# Patient Record
Sex: FEMALE | Race: ASIAN | NOT HISPANIC OR LATINO | Employment: STUDENT | URBAN - METROPOLITAN AREA
[De-identification: names, ages, dates, MRNs, and addresses within clinical notes are randomized per-mention and may not be internally consistent; named-entity substitution may affect disease eponyms.]

---

## 2017-01-09 ENCOUNTER — TELEPHONE (OUTPATIENT)
Dept: FAMILY MEDICINE | Age: 22
End: 2017-01-09

## 2017-01-12 ENCOUNTER — TELEPHONE (OUTPATIENT)
Dept: FAMILY MEDICINE | Age: 22
End: 2017-01-12

## 2017-01-12 ENCOUNTER — IMAGING SERVICES (OUTPATIENT)
Dept: ULTRASOUND IMAGING | Age: 22
End: 2017-01-12
Attending: FAMILY MEDICINE

## 2017-01-12 ENCOUNTER — LAB SERVICES (OUTPATIENT)
Dept: LAB | Age: 22
End: 2017-01-12

## 2017-01-12 ENCOUNTER — OFFICE VISIT (OUTPATIENT)
Dept: FAMILY MEDICINE | Age: 22
End: 2017-01-12

## 2017-01-12 VITALS
HEIGHT: 63 IN | SYSTOLIC BLOOD PRESSURE: 112 MMHG | BODY MASS INDEX: 22.5 KG/M2 | WEIGHT: 127 LBS | DIASTOLIC BLOOD PRESSURE: 72 MMHG | HEART RATE: 72 BPM | OXYGEN SATURATION: 99 %

## 2017-01-12 DIAGNOSIS — G44.229 CHRONIC TENSION-TYPE HEADACHE, NOT INTRACTABLE: ICD-10-CM

## 2017-01-12 DIAGNOSIS — G89.29 CHRONIC NONINTRACTABLE HEADACHE, UNSPECIFIED HEADACHE TYPE: ICD-10-CM

## 2017-01-12 DIAGNOSIS — R10.30 LOWER ABDOMINAL PAIN: ICD-10-CM

## 2017-01-12 DIAGNOSIS — R51.9 CHRONIC NONINTRACTABLE HEADACHE, UNSPECIFIED HEADACHE TYPE: ICD-10-CM

## 2017-01-12 DIAGNOSIS — R10.30 LOWER ABDOMINAL PAIN: Primary | ICD-10-CM

## 2017-01-12 DIAGNOSIS — N92.6 IRREGULAR MENSES: ICD-10-CM

## 2017-01-12 LAB
ALBUMIN SERPL-MCNC: 3.4 G/DL (ref 3.6–5.1)
ALBUMIN/GLOB SERPL: 0.9 {RATIO} (ref 1–2.4)
ALP SERPL-CCNC: 30 UNITS/L (ref 45–117)
ALT SERPL-CCNC: 21 UNITS/L
ANION GAP SERPL CALC-SCNC: 11 MMOL/L (ref 10–20)
AST SERPL-CCNC: 30 UNITS/L
BASO+EOS+MONOS # BLD AUTO: 0.2 K/MCL (ref 0.1–1.1)
BASO+EOS+MONOS NFR BLD AUTO: 5 %
BILIRUB SERPL-MCNC: 1 MG/DL (ref 0.2–1)
BUN SERPL-MCNC: 10 MG/DL (ref 10–20)
BUN/CREAT SERPL: 10 (ref 7–25)
CALCIUM SERPL-MCNC: 8.8 MG/DL (ref 8.4–10.2)
CHLORIDE SERPL-SCNC: 105 MMOL/L (ref 98–107)
CO2 SERPL-SCNC: 23 MMOL/L (ref 21–32)
CREAT SERPL-MCNC: 1 MG/DL (ref 0.51–0.95)
DIFFERENTIAL METHOD BLD: NORMAL
ERYTHROCYTE [DISTWIDTH] IN BLOOD: 12.8 % (ref 11–15)
ERYTHROCYTE [SEDIMENTATION RATE] IN BLOOD: 13 MM/HR (ref 0–20)
FASTING STATUS PATIENT QL REPORTED: 1 HRS
GLOBULIN SER-MCNC: 3.6 G/DL (ref 2–4)
GLUCOSE SERPL-MCNC: 82 MG/DL (ref 65–99)
HCT VFR BLD CALC: 37.3 % (ref 36–46.5)
HGB BLD-MCNC: 12.7 G/DL (ref 12–15.5)
LYMPHOCYTES # BLD AUTO: 1.8 K/MCL (ref 1.2–5.2)
LYMPHOCYTES NFR BLD AUTO: 40 %
MCH RBC QN AUTO: 29.5 PG (ref 26–34)
MCHC RBC AUTO-ENTMCNC: 34 G/DL (ref 32–36.5)
MCV RBC AUTO: 86.5 FL (ref 78–100)
NEUTROPHILS # BLD AUTO: 2.5 K/MCL (ref 1.8–8)
NEUTROPHILS NFR BLD AUTO: 55 %
PLATELET # BLD: 235 K/MCL (ref 140–450)
POTASSIUM SERPL-SCNC: 4.1 MMOL/L (ref 3.4–5.1)
PROT SERPL-MCNC: 7 G/DL (ref 6.4–8.2)
RBC # BLD: 4.31 MIL/MCL (ref 4–5.2)
SODIUM SERPL-SCNC: 135 MMOL/L (ref 135–145)
TSH SERPL-ACNC: 3.36 MCUNITS/ML (ref 0.35–5)
WBC # BLD: 4.5 K/MCL (ref 4.2–11)

## 2017-01-12 PROCEDURE — 80050 GENERAL HEALTH PANEL: CPT | Performed by: INTERNAL MEDICINE

## 2017-01-12 PROCEDURE — 76830 TRANSVAGINAL US NON-OB: CPT | Performed by: RADIOLOGY

## 2017-01-12 PROCEDURE — 36415 COLL VENOUS BLD VENIPUNCTURE: CPT | Performed by: INTERNAL MEDICINE

## 2017-01-12 PROCEDURE — 99214 OFFICE O/P EST MOD 30 MIN: CPT | Performed by: FAMILY MEDICINE

## 2017-01-12 PROCEDURE — 85652 RBC SED RATE AUTOMATED: CPT | Performed by: INTERNAL MEDICINE

## 2017-01-12 PROCEDURE — 76856 US EXAM PELVIC COMPLETE: CPT | Performed by: RADIOLOGY

## 2017-01-12 RX ORDER — MELOXICAM 15 MG/1
15 TABLET ORAL DAILY PRN
Qty: 90 TABLET | Refills: 4 | Status: SHIPPED | OUTPATIENT
Start: 2017-01-12 | End: 2019-11-25

## 2017-01-12 RX ORDER — TIZANIDINE 2 MG/1
2 TABLET ORAL EVERY 6 HOURS PRN
Qty: 90 TABLET | Refills: 4 | Status: SHIPPED | OUTPATIENT
Start: 2017-01-12 | End: 2019-11-25

## 2017-01-13 RX ORDER — NORETHINDRONE ACETATE AND ETHINYL ESTRADIOL 1.5-30(21)
1 KIT ORAL DAILY
Qty: 84 TABLET | Refills: 4 | Status: SHIPPED | OUTPATIENT
Start: 2017-01-13 | End: 2017-04-28 | Stop reason: SDUPTHER

## 2017-01-13 ASSESSMENT — ENCOUNTER SYMPTOMS
ABDOMINAL DISTENTION: 1
TROUBLE SWALLOWING: 0
DIZZINESS: 0
FEVER: 0
CHILLS: 0
SHORTNESS OF BREATH: 0
WEAKNESS: 0
HEADACHES: 1
NERVOUS/ANXIOUS: 1
FATIGUE: 1
DIARRHEA: 1
LIGHT-HEADEDNESS: 0
WHEEZING: 0
COUGH: 0
ABDOMINAL PAIN: 1

## 2017-01-16 DIAGNOSIS — L70.9 ADULT ACNE: ICD-10-CM

## 2017-01-16 RX ORDER — CLINDAMYCIN PHOSPHATE 10 MG/G
GEL TOPICAL
Qty: 60 G | Refills: 11 | Status: SHIPPED | OUTPATIENT
Start: 2017-01-16 | End: 2019-11-25

## 2017-04-27 ENCOUNTER — TELEPHONE (OUTPATIENT)
Dept: FAMILY MEDICINE | Age: 22
End: 2017-04-27

## 2017-04-27 DIAGNOSIS — N92.6 IRREGULAR MENSES: ICD-10-CM

## 2017-04-28 RX ORDER — NORETHINDRONE ACETATE AND ETHINYL ESTRADIOL 1.5-30(21)
1 KIT ORAL DAILY
Qty: 84 TABLET | Refills: 4 | Status: SHIPPED | OUTPATIENT
Start: 2017-04-28 | End: 2017-09-07 | Stop reason: SDUPTHER

## 2017-09-07 DIAGNOSIS — N92.6 IRREGULAR MENSES: ICD-10-CM

## 2017-09-07 RX ORDER — NORETHINDRONE ACETATE AND ETHINYL ESTRADIOL AND FERROUS FUMARATE 1.5-30(21)
KIT ORAL
Qty: 84 TABLET | Refills: 1 | Status: SHIPPED | OUTPATIENT
Start: 2017-09-07 | End: 2019-11-25 | Stop reason: ALTCHOICE

## 2017-11-25 ENCOUNTER — IMAGING SERVICES (OUTPATIENT)
Dept: GENERAL RADIOLOGY | Age: 22
End: 2017-11-25
Attending: FAMILY MEDICINE

## 2017-11-25 ENCOUNTER — WALK IN (OUTPATIENT)
Dept: URGENT CARE | Age: 22
End: 2017-11-25

## 2017-11-25 VITALS
RESPIRATION RATE: 16 BRPM | HEART RATE: 98 BPM | WEIGHT: 115 LBS | DIASTOLIC BLOOD PRESSURE: 64 MMHG | HEIGHT: 60 IN | TEMPERATURE: 98.5 F | SYSTOLIC BLOOD PRESSURE: 106 MMHG | BODY MASS INDEX: 22.58 KG/M2

## 2017-11-25 DIAGNOSIS — M79.671 RIGHT FOOT PAIN: ICD-10-CM
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
## 2017-11-25 DIAGNOSIS — M79.672 LEFT FOOT PAIN: ICD-10-CM

## 2017-11-25 DIAGNOSIS — M79.671 RIGHT FOOT PAIN: Primary | ICD-10-CM

## 2017-11-25 DIAGNOSIS — S96.911A REPETITIVE STRAIN INJURY OF RIGHT FOOT, INITIAL ENCOUNTER: ICD-10-CM

## 2017-11-25 PROCEDURE — 99213 OFFICE O/P EST LOW 20 MIN: CPT | Performed by: FAMILY MEDICINE

## 2017-11-25 PROCEDURE — 73630 X-RAY EXAM OF FOOT: CPT | Performed by: RADIOLOGY

## 2019-11-25 ENCOUNTER — OFFICE VISIT (OUTPATIENT)
Dept: FAMILY MEDICINE | Age: 24
End: 2019-11-25

## 2019-11-25 VITALS
HEART RATE: 84 BPM | TEMPERATURE: 97.7 F | DIASTOLIC BLOOD PRESSURE: 70 MMHG | SYSTOLIC BLOOD PRESSURE: 110 MMHG | BODY MASS INDEX: 22.82 KG/M2 | WEIGHT: 116.84 LBS

## 2019-11-25 DIAGNOSIS — K21.9 GASTROESOPHAGEAL REFLUX DISEASE, ESOPHAGITIS PRESENCE NOT SPECIFIED: ICD-10-CM

## 2019-11-25 DIAGNOSIS — Z00.00 ENCOUNTER FOR PREVENTIVE HEALTH EXAMINATION: Primary | ICD-10-CM

## 2019-11-25 PROCEDURE — 99385 PREV VISIT NEW AGE 18-39: CPT | Performed by: FAMILY MEDICINE

## 2019-11-25 RX ORDER — NORETHINDRONE ACETATE AND ETHINYL ESTRADIOL .03; 1.5 MG/1; MG/1
1 TABLET ORAL DAILY
COMMUNITY
End: 2019-11-25 | Stop reason: SDUPTHER

## 2019-11-25 RX ORDER — OMEPRAZOLE 40 MG/1
40 CAPSULE, DELAYED RELEASE ORAL DAILY
Qty: 30 CAPSULE | Refills: 1 | Status: SHIPPED | OUTPATIENT
Start: 2019-11-25 | End: 2019-11-25 | Stop reason: SDUPTHER

## 2019-11-25 RX ORDER — OMEPRAZOLE 40 MG/1
40 CAPSULE, DELAYED RELEASE ORAL DAILY
Qty: 90 CAPSULE | Refills: 0 | Status: SHIPPED | OUTPATIENT
Start: 2019-11-25

## 2019-11-25 RX ORDER — NORETHINDRONE ACETATE AND ETHINYL ESTRADIOL 1.5; 3 MG/1; UG/1
1 TABLET ORAL DAILY
Qty: 84 TABLET | Refills: 3 | Status: SHIPPED | OUTPATIENT
Start: 2019-11-25

## 2019-11-25 ASSESSMENT — PATIENT HEALTH QUESTIONNAIRE - PHQ9
SUM OF ALL RESPONSES TO PHQ9 QUESTIONS 1 AND 2: 0
2. FEELING DOWN, DEPRESSED OR HOPELESS: NOT AT ALL
1. LITTLE INTEREST OR PLEASURE IN DOING THINGS: NOT AT ALL
SUM OF ALL RESPONSES TO PHQ9 QUESTIONS 1 AND 2: 0

## 2020-03-25 ENCOUNTER — V-VISIT (OUTPATIENT)
Dept: FAMILY MEDICINE | Age: 25
End: 2020-03-25

## 2020-03-25 ENCOUNTER — NURSE TRIAGE (OUTPATIENT)
Dept: FAMILY MEDICINE | Age: 25
End: 2020-03-25

## 2020-03-25 DIAGNOSIS — J02.9 ACUTE VIRAL PHARYNGITIS: Primary | ICD-10-CM

## 2020-03-25 PROCEDURE — 99443 TELEPHONE E&M BY PHYSICIAN EST PT NOT ORIG PREV 7 DAYS 21-30 MIN: CPT | Performed by: NURSE PRACTITIONER

## 2020-03-25 RX ORDER — ACETAMINOPHEN 325 MG/1
650 TABLET ORAL EVERY 6 HOURS PRN
Qty: 30 TABLET | Refills: 0 | Status: SHIPPED | COMMUNITY
Start: 2020-03-25 | End: 2020-04-24

## 2020-07-06 ENCOUNTER — THERAPY VISIT (OUTPATIENT)
Dept: PHYSICAL THERAPY | Facility: CLINIC | Age: 25
End: 2020-07-06
Payer: COMMERCIAL

## 2020-07-06 DIAGNOSIS — S76.312A LEFT HAMSTRING MUSCLE STRAIN: ICD-10-CM

## 2020-07-06 DIAGNOSIS — M25.571 PAIN IN JOINT INVOLVING ANKLE AND FOOT, RIGHT: ICD-10-CM

## 2020-07-06 DIAGNOSIS — M94.0 COSTOCHONDRITIS: ICD-10-CM

## 2020-07-06 PROCEDURE — 97110 THERAPEUTIC EXERCISES: CPT | Mod: GP | Performed by: PHYSICAL THERAPIST

## 2020-07-06 PROCEDURE — 97161 PT EVAL LOW COMPLEX 20 MIN: CPT | Mod: GP | Performed by: PHYSICAL THERAPIST

## 2020-07-06 NOTE — PROGRESS NOTES
[facility] Initial Evaluation  Subjective:  The history is provided by the patient. No  was used.   Therapist Generated HPI Evaluation  Problem details: Had a viral infection in March that lead to a lot of coughing.  Pain started March 30th with pain on the left greater than right. Took Naproxen for two months and this helped.  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  Has not been doing any strength training and lighter weights.  Dance irritates.  Stretching helps.  Works in IT during the day.. Sitting in her kitchen at a barstool. Icing twice a day and also using heating pad.  Ice helps more.  .         Affected Side: sternum.    This is a new condition.  Occurance: after virus.    Site of Pain: left thoracic.        Associated symptoms:  Loss of strength and loss of motion/stiffness. Exacerbated by: seatbelt and back pack.  and relieved by heat, ice and NSAID's.          Therapist Generated HPI Evaluation  Problem details: Left hamstring pain after attempting left splits with training for dance team.  That happened April 30th.  Felt a sudden pop.  Now slowly stretching.  Has not been strengthening.  Pain with sitting.  No radiating pain.  Mostly dancing for activity and does 10 min of yoga in morning and evening.  Has returned to some [employer] classes. .         Type of problem:  Left hip.      Condition occurred with:  Other reason.            Associated symptoms:  Loss of strength. Symptoms are exacerbated by sitting (stretching)  and relieved by rest.                              Objective:  System    Ankle/Foot Evaluation  ROM:  Arom ankle eval: pain right ankle with end range active pf.            PALPATION: Palpation of ankle: swelling at right extensor retinaculum.                  Cervical/Thoracic Evaluation  Cervical AROM: normal                                  Shoulder Evaluation:  ROM:  AROM:  : pain in the chest with  repeated abduction                                   Strength:  : pain with resisted adduction                           Palpation:  Palpation assessed shoulder: pain in left greater than right upper ribs/sternum.                              Hip Evaluation  Hip PROM:  : limited by decreased hamstring flexibility on the left.                            Hip Special Testing:   Special tests hip not assessed: limited forward pain due to left hamstring pain.        Hip Palpation:  Palpations normal left hip: pain at ischial tuberosity.      Functional Testing:  Functional test hip: pain with single leg deadlift.                       General     ROS    Assessment/Plan:    Patient is a 24 year old female with thoracic, left side hip and right side ankle complaints.    Patient has the following significant findings with corresponding treatment plan.                Diagnosis 1:  costochondritis  Pain -  hot/cold therapy, manual therapy, splint/taping/bracing/orthotics, self management, education and home program  Decreased ROM/flexibility - manual therapy, therapeutic exercise and home program  Decreased strength - therapeutic exercise, therapeutic activities and home program  Decreased function - therapeutic activities and home program  Diagnosis 2:  Left hamstring strain   Pain -  manual therapy, self management, education and home program  Decreased ROM/flexibility - manual therapy, therapeutic exercise and home program  Decreased strength - therapeutic exercise, therapeutic activities and home program  Decreased function - therapeutic activities and home program  Diagnosis 3:  Right ankle pain  Pain -  hot/cold therapy, self management, education and home program  Decreased ROM/flexibility - manual therapy, therapeutic exercise and home program     Therapy Evaluation Codes:   1) History comprised of:   Personal factors that impact the plan of care:      None.    Comorbidity factors that impact the plan of care are:       None.     Medications impacting care: None.  2) Examination of Body Systems comprised of:   Body structures and functions that impact the plan of care:      Ankle, Hip and Thoracic Spine.   Activity limitations that impact the plan of care are:      Bathing, Sitting, Sports and Squatting/kneeling.  3) Clinical presentation characteristics are:   Stable/Uncomplicated.  4) Decision-Making    Low complexity using standardized patient assessment instrument and/or measureable assessment of functional outcome.  Cumulative Therapy Evaluation is: Low complexity.    Previous and current functional limitations:  (See Goal Flow Sheet for this information)    Short term and Long term goals: (See Goal Flow Sheet for this information)     Communication ability:  Patient appears to be able to clearly communicate and understand verbal and written communication and follow directions correctly.  Treatment Explanation - The following has been discussed with the patient:   RX ordered/plan of care  Anticipated outcomes  Possible risks and side effects  This patient would benefit from PT intervention to resume normal activities.   Rehab potential is excellent.    Frequency:  1 X week, once daily  Duration:  for 4 weeks  Discharge Plan:  Independent in home treatment program.  Reach maximal therapeutic benefit.    Please refer to the daily flowsheet for treatment today, total treatment time and time spent performing 1:1 timed codes.

## 2020-07-15 NOTE — PROGRESS NOTES
[facility] Initial Evaluation  Subjective:    Patient Health History           General health as reported by patient is good.     Red flags:  Chest pain.  Medical allergies: none.   Surgeries include:  None.    Current medications:  None.    Current occupation is .   Primary job tasks include:  Computer work.                                    Objective:  System    Physical Exam    General     ROS    Assessment/Plan:

## 2020-08-18 PROBLEM — S76.312A LEFT HAMSTRING MUSCLE STRAIN: Status: RESOLVED | Noted: 2020-07-06 | Resolved: 2020-08-18

## 2020-08-18 PROBLEM — M25.571 PAIN IN JOINT INVOLVING ANKLE AND FOOT, RIGHT: Status: RESOLVED | Noted: 2020-07-06 | Resolved: 2020-08-18

## 2020-08-18 PROBLEM — M94.0 COSTOCHONDRITIS: Status: RESOLVED | Noted: 2020-07-06 | Resolved: 2020-08-18

## 2020-12-27 ENCOUNTER — HEALTH MAINTENANCE LETTER (OUTPATIENT)
Age: 25
End: 2020-12-27

## 2021-04-07 ENCOUNTER — IMMUNIZATION (OUTPATIENT)
Dept: NURSING | Facility: CLINIC | Age: 26
End: 2021-04-07
Payer: OTHER GOVERNMENT

## 2021-04-07 PROCEDURE — 91300 PR COVID VAC PFIZER DIL RECON 30 MCG/0.3 ML IM: CPT

## 2021-04-07 PROCEDURE — 0001A PR COVID VAC PFIZER DIL RECON 30 MCG/0.3 ML IM: CPT

## 2021-05-03 ENCOUNTER — IMMUNIZATION (OUTPATIENT)
Dept: NURSING | Facility: CLINIC | Age: 26
End: 2021-05-03
Attending: INTERNAL MEDICINE
Payer: OTHER GOVERNMENT

## 2021-05-03 PROCEDURE — 91300 PR COVID VAC PFIZER DIL RECON 30 MCG/0.3 ML IM: CPT

## 2021-05-03 PROCEDURE — 0002A PR COVID VAC PFIZER DIL RECON 30 MCG/0.3 ML IM: CPT

## 2021-10-09 ENCOUNTER — HEALTH MAINTENANCE LETTER (OUTPATIENT)
Age: 26
End: 2021-10-09

## 2022-01-29 ENCOUNTER — HEALTH MAINTENANCE LETTER (OUTPATIENT)
Age: 27
End: 2022-01-29

## 2022-09-11 ENCOUNTER — HEALTH MAINTENANCE LETTER (OUTPATIENT)
Age: 27
End: 2022-09-11

## 2023-05-06 ENCOUNTER — HEALTH MAINTENANCE LETTER (OUTPATIENT)
Age: 28
End: 2023-05-06

## 2025-04-26 ENCOUNTER — HEALTH MAINTENANCE LETTER (OUTPATIENT)
Age: 30
End: 2025-04-26

## 2025-05-23 ENCOUNTER — TRANSFERRED RECORDS (OUTPATIENT)
Dept: MULTI SPECIALTY CLINIC | Facility: CLINIC | Age: 30
End: 2025-05-23

## 2025-05-23 PROBLEM — K21.9 GASTROESOPHAGEAL REFLUX DISEASE: Status: ACTIVE | Noted: 2019-11-25

## 2025-05-23 PROBLEM — K58.1 IRRITABLE BOWEL SYNDROME WITH CONSTIPATION: Status: ACTIVE | Noted: 2025-05-23

## 2025-05-23 LAB — PAP SMEAR - HIM PATIENT REPORTED: NORMAL

## 2025-05-23 PROCEDURE — 87624 HPV HI-RISK TYP POOLED RSLT: CPT | Mod: ORL | Performed by: FAMILY MEDICINE

## 2025-05-23 PROCEDURE — 82306 VITAMIN D 25 HYDROXY: CPT | Mod: ORL | Performed by: FAMILY MEDICINE

## 2025-05-23 PROCEDURE — 86803 HEPATITIS C AB TEST: CPT | Mod: ORL | Performed by: FAMILY MEDICINE

## 2025-05-23 PROCEDURE — 80061 LIPID PANEL: CPT | Mod: ORL | Performed by: FAMILY MEDICINE

## 2025-05-23 PROCEDURE — 82728 ASSAY OF FERRITIN: CPT | Mod: ORL | Performed by: FAMILY MEDICINE

## 2025-05-23 PROCEDURE — 84443 ASSAY THYROID STIM HORMONE: CPT | Mod: ORL | Performed by: FAMILY MEDICINE

## 2025-05-23 PROCEDURE — 82607 VITAMIN B-12: CPT | Mod: ORL | Performed by: FAMILY MEDICINE

## 2025-05-26 ENCOUNTER — PATIENT OUTREACH (OUTPATIENT)
Dept: CARE COORDINATION | Facility: CLINIC | Age: 30
End: 2025-05-26
Payer: COMMERCIAL

## 2025-05-27 ENCOUNTER — RESULTS FOLLOW-UP (OUTPATIENT)
Dept: FAMILY MEDICINE | Facility: CLINIC | Age: 30
End: 2025-05-27

## 2025-05-28 ENCOUNTER — PATIENT OUTREACH (OUTPATIENT)
Dept: CARE COORDINATION | Facility: CLINIC | Age: 30
End: 2025-05-28
Payer: COMMERCIAL

## 2025-07-10 ENCOUNTER — OFFICE VISIT (OUTPATIENT)
Dept: FAMILY MEDICINE | Facility: CLINIC | Age: 30
End: 2025-07-10
Payer: COMMERCIAL

## 2025-07-10 VITALS
DIASTOLIC BLOOD PRESSURE: 71 MMHG | TEMPERATURE: 97.5 F | BODY MASS INDEX: 20.23 KG/M2 | SYSTOLIC BLOOD PRESSURE: 104 MMHG | OXYGEN SATURATION: 97 % | WEIGHT: 118.5 LBS | RESPIRATION RATE: 14 BRPM | HEART RATE: 98 BPM

## 2025-07-10 DIAGNOSIS — L29.0 RECTAL ITCHING: ICD-10-CM

## 2025-07-10 DIAGNOSIS — Z12.4 SCREENING FOR MALIGNANT NEOPLASM OF CERVIX: ICD-10-CM

## 2025-07-10 DIAGNOSIS — B97.7 HPV (HUMAN PAPILLOMA VIRUS) INFECTION: Primary | ICD-10-CM

## 2025-07-10 PROCEDURE — 88175 CYTOPATH C/V AUTO FLUID REDO: CPT | Mod: ORL | Performed by: FAMILY MEDICINE

## 2025-07-10 NOTE — PROGRESS NOTES
Assessment & Plan     Eva was seen today for follow up.    Diagnoses and all orders for this visit:    HPV (human papilloma virus) infection  -     Gynecologic Cytology (PAP Smear); Future    Rectal itching    Screening for malignant neoplasm of cervix  -     Gynecologic Cytology (PAP Smear); Future      Can continue PRN use of mometasone cream. Did remind regarding use < 14 consecutive days.  Pap updated today.    Subjective   Eva is a 29 year old, presenting for the following health issues:  Follow Up (Prior visit, PAP, labs)      Via the Health Maintenance questionnaire, the patient has reported the following services have been completed -Cervical Cancer Screening: Same doctor 2025-05-23, this information has been sent to the abstraction team.  HPI      Here today for follow-up. Completed HPV only screening for cervical cancer and was positive for non-16/18 HPV. Completing Pap today.    Anal itching resolved after 3 days on the mometasone cream. Some has started to return but not as severe as previously.      Objective    /71 (BP Location: Left arm, Patient Position: Sitting, Cuff Size: Adult Regular)   Pulse 98   Temp 97.5  F (36.4  C) (Temporal)   Resp 14   Wt 53.8 kg (118 lb 8 oz)   LMP 05/05/2025   SpO2 97%   BMI 20.23 kg/m    Body mass index is 20.23 kg/m .  Physical Exam   GENERAL: alert and no distress  HEENT: Normocephalic, atraumatic. Mucus membranes moist.   Resp: No respiratory distress   MSK: No peripheral edema.   Skin: No rashes or lesions noted.   Psych: Appropriate affect. Mood is good    (female): normal female external genitalia, normal urethral meatus, normal vaginal mucosa. Cervix visualized and normal, pap collected. Perianal skin slightly ashy but improved from last visit.    Office Visit on 05/23/2025   Component Date Value Ref Range Status    WBC Count 05/23/2025 5.5  4.0 - 11.0 10e3/uL Final    RBC Count 05/23/2025 4.42  3.80 - 5.20 10e6/uL Final    Hemoglobin  05/23/2025 12.4  11.7 - 15.7 g/dL Final    Hematocrit 05/23/2025 37.9  35.0 - 47.0 % Final    MCV 05/23/2025 86  78 - 100 fL Final    MCH 05/23/2025 28.1  26.5 - 33.0 pg Final    MCHC 05/23/2025 32.7  31.5 - 36.5 g/dL Final    RDW 05/23/2025 12.5  10.0 - 15.0 % Final    Platelet Count 05/23/2025 233  150 - 450 10e3/uL Final    Ferritin 05/23/2025 36  6 - 175 ng/mL Final    Vitamin D, Total (25-Hydroxy) 05/23/2025 28  20 - 50 ng/mL Final    optimum levels    Vitamin B12 05/23/2025 536  232 - 1,245 pg/mL Final    TSH 05/23/2025 2.54  0.30 - 4.20 uIU/mL Final    Human Papilloma Virus 16 DNA 05/23/2025 Negative  Negative Final    Human Papilloma Virus 18 DNA 05/23/2025 Negative  Negative Final    Human Papilloma Virus Other 05/23/2025 Positive (A)  Negative Final    FINAL DIAGNOSIS 05/23/2025    Final                    Value:This patient's sample is positive for other HR HPV DNA (types 31, 33, 35, 39, 45, 51, 52, 56, 58, 59, 66 or 68), not HPV 16 or HPV 18 DNA. This result requires clinical correlation with concurrent cytology findings.      This test was developed and its performance characteristics determined by the [facility]. It has not been cleared or approved by the FDA. The laboratory is regulated under CLIA as qualified to perform high-complexity testing. This test is used for clinical purposes. It should not be regarded as investigational or for research.    METHODOLOGY: The Roche Jeffrey 4800 system uses automated extraction, simultaneous amplification of HPV (L1 region) and beta-globin, followed by real time detection of fluorescent labeled HPV and beta globin using specific oligonucleotide probes. The test specifically identifies types HPV 16 DNA and HPV 18 DNA while concurrently detecting the rest of the high risk types (31, 33, 35,                           39, 45, 51, 52, 56, 58, 59, 66 or 68).    COMMENTS: This test is not intended for use as a  screening device for woman under age 30 with normal cervical cytology. Results should be correlated with cytologic and histologic findings. Close clinical followup is recommended.        Hepatitis C Antibody 05/23/2025 Nonreactive  Nonreactive Final    A nonreactive screening test result does not exclude the possibility of exposure to or infection with HCV. Nonreactive screening test results in individuals with prior exposure to HCV may be due to antibody levels below the limit of detection of this assay or lack of reactivity to the HCV antigens used in this assay. Patients with recent HCV infections (<3 months from time of exposure) may have false-negative HCV antibody results due to the time needed for seroconversion (average of 8 to 9 weeks).    Estimated Average Glucose 05/23/2025 105  <117 mg/dL Final    Hemoglobin A1C 05/23/2025 5.3  0.0 - 5.6 % Final    Normal <5.7%   Prediabetes 5.7-6.4%    Diabetes 6.5% or higher     Note: Adopted from ADA consensus guidelines.    Cholesterol 05/23/2025 209 (H)  <200 mg/dL Final    Triglycerides 05/23/2025 50  <150 mg/dL Final    Direct Measure HDL 05/23/2025 61  >=50 mg/dL Final    LDL Cholesterol Calculated 05/23/2025 138 (H)  <100 mg/dL Final    Non HDL Cholesterol 05/23/2025 148 (H)  <130 mg/dL Final    Patient Fasting > 8hrs? 05/23/2025 No   Final           Signed Electronically by: Alexandra Morris MD

## 2025-07-10 NOTE — NURSING NOTE
"29 year old  Chief Complaint   Patient presents with    Follow Up     Prior visit, PAP, labs       Blood pressure 104/71, pulse 98, temperature 97.5  F (36.4  C), temperature source Temporal, resp. rate 14, weight 53.8 kg (118 lb 8 oz), last menstrual period 05/05/2025, SpO2 97%. Body mass index is 20.23 kg/m .  Patient Active Problem List   Diagnosis    Gastroesophageal reflux disease    Irritable bowel syndrome with constipation       Wt Readings from Last 2 Encounters:   07/10/25 53.8 kg (118 lb 8 oz)   05/23/25 55.2 kg (121 lb 12 oz)     BP Readings from Last 3 Encounters:   07/10/25 104/71   05/23/25 105/67         Current Outpatient Medications   Medication Sig Dispense Refill    ASHWAGANDHA PO Take by mouth.      echincea extract capsule Take 250 mg by mouth daily.      ELDERBERRY PO Take by mouth.      lactobacillus rhamnosus, GG, (CULTURELL) capsule Take 1 capsule by mouth 2 times daily.      mometasone (ELOCON) 0.1 % external ointment Apply topically daily. Do not apply for more than 14 consecutive days 15 g 0    multivitamin w/minerals (MULTI-VITAMIN) tablet Take 1 tablet by mouth daily.      psyllium 400 MG capsule Take 5 capsules by mouth daily.      Turmeric (CURCUMIN 95 PO) Take by mouth.       No current facility-administered medications for this visit.       Social History     Tobacco Use    Smoking status: Never    Smokeless tobacco: Never   Vaping Use    Vaping status: Never Used   Substance Use Topics    Alcohol use: Never     Comment: no use since 2020    Drug use: Never       Health Maintenance Due   Topic Date Due    ADVANCE CARE PLANNING  Never done    PAP  06/03/2024    YEARLY PREVENTIVE VISIT  06/22/2024    COVID-19 VACCINE (3 - 2024-25 season) 09/01/2024       No results found for: \"PAP\"      July 10, 2025 10:53 AM    "

## 2025-07-10 NOTE — PATIENT INSTRUCTIONS
Good to see you today!    We completed your Pap smear (cervical cancer screening) today. You will hear back within 1 week with your results.    For itching - recommend continuing moisturizing, using bidet or sitz baths after bowel movements.  Can use cream 1-2 times per WEEK for ongoing maintenance if itching continues to return.  Can repeat 7-14 days course if you've been off for at least 2 weeks.

## 2025-07-15 PROBLEM — B97.7 HPV (HUMAN PAPILLOMA VIRUS) INFECTION: Status: ACTIVE | Noted: 2025-07-15

## 2025-07-15 LAB
BKR LAB AP GYN ADEQUACY: NORMAL
BKR LAB AP GYN INTERPRETATION: NORMAL
BKR LAB AP HPV REFLEX: NO
BKR LAB AP LMP: NORMAL
BKR LAB AP PREVIOUS ABNL DX: NORMAL
BKR LAB AP PREVIOUS ABNORMAL: NORMAL
PATH REPORT.COMMENTS IMP SPEC: NORMAL
PATH REPORT.COMMENTS IMP SPEC: NORMAL
PATH REPORT.RELEVANT HX SPEC: NORMAL